# Patient Record
(demographics unavailable — no encounter records)

---

## 2020-08-05 NOTE — RAD
EXAM:

Chest 2 views:



HISTORY:

Shortness of breath



COMPARISON:

11/15/2018



FINDINGS:

There is a normal-sized cardiomediastinal silhouette.  Atherosclerotic calcifications are seen in the
 aorta.  Stable scarring is seen in the right lung base. There may be a small right pleural

effusion. Increased interstitial lung markings are present.  Degenerative changes are seen in the spi
ne. Postsurgical changes are seen in the right shoulder.



IMPRESSION:

Chronic interstitial lung disease with possible small right pleural effusion.



Reported By: Yefri Bay 

Electronically Signed:  8/5/2020 12:16 PM

## 2020-10-01 NOTE — MRI
LUMBAR SPINE MRI WITHOUT CONTRAST:

 

HISTORY: 

Lumbar stenosis with neurogenic claudication.  Low back pain x 30 years.  Two prior lumbar surgeries.


 

COMPARISON: 

8/5/2019.

 

FINDINGS: 

Due to diminished renal function, the patient was not given Gadolinium.

 

FINDINGS: 

There is stable fusion at L4-L5.  There is appropriate T1 marrow signal intensity of the lumbar verte
brae.  Lumbar spine vertebral body heights are maintained.  There is no fracture.  There is a remote 
mild compression fracture at T12.

 

Currently, there is no significant STIR hyperintensity involving the lumbar vertebrae.

 

There is appropriate signal intensity in the visualized paraspinal muscles and solid organs.  There i
s atherosclerosis and elongation of the visualized aorta.

 

T2 hyperintensities involving the left and right kidney compatible with cortical cysts.

 

Conus medullaris terminates at the mid L1 level.

 

T12-L1:  Adequate disk hydration.  No posterior disk abnormality.  No significant central canal steno
sis.  Mild bilateral neural foraminal narrowing.

 

L1-L2:  Minimal disk desiccation.  No significant loss of disk space height.  Broad-based disk bulge 
abuts the thecal sac.  No significant central canal stenosis.  Patent bilateral neural foramina.

 

L2-L3:  Mild disk desiccation without significant loss of disk space height.  Broad-based disk bulge,
 ligamentum flavum thickening, and facet hypertrophy result in mild central canal stenosis.  Patent r
ight and left neural foramina.

 

L3-L4:  Mild disk desiccation without significant loss of disk space height.  There is a broad-based 
disk bulge with an associated central annular fissure.  There is ligamentum flavum thickening and fac
et hypertrophy.  Moderate to severe central canal stenosis.  Mild to moderate bilateral neural forami
nal narrowing.

 

L4-L5:  There is fusion of the disk space.  There is a broad-based osteophyte ridge.  There is mild c
entral canal stenosis.  Moderate right neural foraminal narrowing.  Patent left neural foramen.

 

L5-S1:  Adequate disk hydration.  Broad-based disk bulge abuts the thecal sac.  Disk material encroac
hes upon both subarticular zones.  There is contact upon both traversing S1 nerve roots without signi
ficant mass effect or obscuration.  There is ligamentum flavum thickening and moderate facet hypertro
phy.  Trace fluid in both facet joints.  Mild central canal stenosis.  Mild bilateral neural foramina
l narrowing.  

 

IMPRESSION: 

1.  There is evidence of L4-L5 disk fusion.  The disk prosthesis appears to be posterior and likely e
xternal to the disk space and encroaches minimally upon the right subarticular zone.

 

2.  Annular fissure with associated moderate to severe central canal stenosis at L3-L4.

 

3.  Previously noted inferior disk migration into the right subarticular zone at L3-L4 is no longer e
vident.

 

POS: ANTHONY

## 2021-01-20 NOTE — RAD
EXAM:

XR Chest Pa   Lat STANDARD



PROVIDED CLINICAL HISTORY:

Dyspnea



COMPARISON:

8/5/2020



FINDINGS:

The cardiac and mediastinal silhouette is unchanged in appearance. Emphysematous changes are again no
isacc. There is elevation of the right hemidiaphragm with right basilar pleural-parenchymal opacity

redemonstrated. The left lung remains clear. There is no evidence for pneumothorax.



IMPRESSION:

Stable radiographic appearance of chest.



Reported By: Chuckie Richardson 

Electronically Signed:  1/20/2021 12:14 PM